# Patient Record
Sex: FEMALE | Race: OTHER | HISPANIC OR LATINO | URBAN - METROPOLITAN AREA
[De-identification: names, ages, dates, MRNs, and addresses within clinical notes are randomized per-mention and may not be internally consistent; named-entity substitution may affect disease eponyms.]

---

## 2024-09-25 ENCOUNTER — APPOINTMENT (RX ONLY)
Dept: URBAN - METROPOLITAN AREA CLINIC 100 | Facility: CLINIC | Age: 64
Setting detail: DERMATOLOGY
End: 2024-09-25

## 2024-09-25 PROBLEM — D03.71 MELANOMA IN SITU OF RIGHT LOWER LIMB, INCLUDING HIP: Status: ACTIVE | Noted: 2024-09-25

## 2024-09-25 PROCEDURE — ? ADDITIONAL NOTES

## 2024-09-25 PROCEDURE — ? CONSULTATION FOR MOHS SURGERY

## 2024-09-25 PROCEDURE — 99202 OFFICE O/P NEW SF 15 MIN: CPT

## 2024-09-25 NOTE — HPI: MOHS SURGERY CONSULTATION
previous_biopsy_has_been_previously_biopsied
Who Is Your Referring Provider?: Out of country provider

## 2024-09-25 NOTE — PROCEDURE: CONSULTATION FOR MOHS SURGERY
Detail Level: Detailed
X Size Of Lesion In Cm (Optional): 0
Other Plan: Referred to Dr. Roland Brown MD
Incorporate Mauc In Note: Yes

## 2024-09-25 NOTE — PROCEDURE: ADDITIONAL NOTES
Detail Level: Simple
Render Risk Assessment In Note?: no
Additional Notes: Explained to patient that Mohs might not be the option to treat this area due to duration of lesion. 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 At this point patient might need amputation of the toe. Refer to Roland Brown MD ( Mohs Surgeon) in [place] for second opinion.\\n\\nRecords from [facility], outside pathology